# Patient Record
(demographics unavailable — no encounter records)

---

## 2024-10-08 NOTE — ASSESSMENT
[FreeTextEntry1] : Impression: This 30-year-old Stateless-speaking patient seen with the .  She has a history of dextrocardia and VSD presents with chronic headache consistent with migraine without aura.  The headaches have been occurring on essentially daily basis.  Most recently the headaches are right hemicranial and there is a cervicogenic component.  Recommendations: Discontinue Ubrelvy and the amitriptyline is being stopped as well.  Previously she received topiramate.  Headaches persist and spite of these medications.  Therefore as a preventative I would suggest Ubrelvy 60 mg to be taken once daily.  In addition I have suggested physical therapy to the cervical spine.  Office follow-up in 6 weeks.  The patient was given several Qulipta samples as we await the authorization for the medication.

## 2024-10-08 NOTE — PHYSICAL EXAM
[FreeTextEntry1] : Head:  Normocephalic Neck: Supple nontender.   Mental Status:  Alert Oriented X3 Speech normal and no aphasia or dysarthria.  Cranial Nerves:  PERRL, Visual Fields full  EOMI no diplopia no ptosis no nystagmus, V through XII intact.  Motor:  No drift, normal strength tone and coordination and no focal atrophy. No abnormal movements. No dysmetria.  Normal rapid alternating movements.   DTRs: Symmetric and 2+.  Plantars flexor.  No Clonus.  Sensory:  Normal testing.  Gait:  Normal.

## 2024-10-08 NOTE — HISTORY OF PRESENT ILLNESS
[FreeTextEntry1] : This patient is seen for an office visit.  She is Greenlandic-speaking and  is available ID number [id number]  This patient has ongoing headaches essentially on a daily basis and worse after a night of work occurring in the a.m. on awakening.  The headaches and are predominantly right hemicranial and they do occasionally seem to originate from the right side of the neck and there is underlying neck discomfort.  There is no improvement with Ubrelvy 100 mg as directed as needed and amitriptyline 10 mg which she has been taking at bedtime prescribed by her GI consultant.  MRI of the brain at 1/23/2024 did reveal a small number of white matter hyperintense foci nonspecific and cerebellar tonsils below the foramen magnum not enough to meet the criteria for Chiari malformation.  There was tortuosity of the left vertebral artery deforming the ventral medullary surface considered to be unremarkable there was sinus changes which were chronic.

## 2024-11-18 NOTE — PHYSICAL EXAM

## 2024-11-18 NOTE — PHYSICAL EXAM

## 2024-11-18 NOTE — DISCUSSION/SUMMARY
[FreeTextEntry1] : The patient is a 30-year-old Citizen of [place] speaking female dextrocardia, VSD with palpitations.  #1 CV- no change on exam, echo not able to visualize VSD again, exercise stress test negative #2 Palpitations- c/w diltiazem 30mg tid, event monitor ordered, #3 General- We discussed adherence to a Mediterranean diet, weight loss of the 20 pounds she gained and at least 30 minutes of daily exercise. STress of working nights contributing.

## 2024-11-18 NOTE — DISCUSSION/SUMMARY
[FreeTextEntry1] : The patient is a 30-year-old [place]raltarian speaking female dextrocardia, VSD with palpitations.  #1 CV- no change on exam, echo not able to visualize VSD again, exercise stress test negative #2 Palpitations- c/w diltiazem 30mg tid, event monitor ordered, #3 General- We discussed adherence to a Mediterranean diet, weight loss of the 20 pounds she gained and at least 30 minutes of daily exercise. STress of working nights contributing.

## 2024-11-18 NOTE — REVIEW OF SYSTEMS
[Weight Gain (___ Lbs)] : [unfilled] ~Ulb weight gain [SOB] : shortness of breath [Dyspnea on exertion] : not dyspnea during exertion [Chest Discomfort] : no chest discomfort [Lower Ext Edema] : no extremity edema [Palpitations] : palpitations [Negative] : Heme/Lymph

## 2024-11-19 NOTE — HISTORY OF PRESENT ILLNESS
[FreeTextEntry1] : This patient is seen for an office visit.  She is Sammarinese-speaking.   ID number [id number].  The patient has ongoing headaches which can be daily especially after a night of work.  She was given a note to bring to work to see if she can be switched to days but her work did not comply.  The headache is associated right-sided neck pain and can the either right or left-sided and can localize about the left eye.  There was no improvement with Ubrelvy amitriptyline or topiramate.  Qulipta 60 mg once daily was ordered but apparently the medication was not delivered to her home or to a local pharmacy and she has not been able to obtain the medication.  She has had physical therapy for neck pain which was of no benefit.  MRI of the brain on 1/23/2024 did reveal some minimal white matter hyperintense nonspecific foci the cerebellar tonsils were below the foramen magnum but not to meet criteria for Chiari malformation.  There was tortuosity of the left vertebral artery deforming the ventral medullary surface considered to be unremarkable.  There was some sinus changes which were chronic.

## 2024-11-19 NOTE — ASSESSMENT
[FreeTextEntry1] : Impression: This 30-year-old Yemeni-speaking patient is seen with the .  She has a history of dextrocardia and VSD and she presents with chronic headache disorder consistent with migraine without aura and there is probably a cervicogenic component.  The headaches have been occurring on a almost daily basis.  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  Recommendations: Her work did not comply with the note that I provided requesting a switch to the dayshift.  Physical therapy was of no benefit.  Medications including Ubrelvy amitriptyline and topiramate were of no benefit.  Suggest trial of Qulipta 60 mg once daily.  If possible the medication should be delivered to her home or local pharmacy.  Office follow-up.

## 2024-11-19 NOTE — PHYSICAL EXAM
[FreeTextEntry1] : Head:  Normocephalic Neck: Mild right paracervical tenderness mild restriction.  Mental Status:  Alert Oriented X3 Speech normal and no aphasia or dysarthria.  Cranial Nerves:  PERRL, Visual Fields full  EOMI no diplopia no ptosis no nystagmus, V through XII intact.  Motor:  No drift, normal strength tone and coordination and no focal atrophy. No abnormal movements. No dysmetria.  Normal rapid alternating movements.   DTRs: Symmetric and 2+.  Plantars flexor.  No Clonus.  Sensory: Normal testing with touch bilaterally.  Gait:  Normal.

## 2024-12-16 NOTE — HISTORY OF PRESENT ILLNESS
[FreeTextEntry1] : Follow up visit. She reports that the pain improved to some degree with amitriptyline. She stopped pantoprazole 3 days ago (prescription ran out).

## 2024-12-16 NOTE — REASON FOR VISIT
[Follow-up] : a follow-up of an existing diagnosis [[employer] ] : provided by [employer]   [Interpreters_IDNumber] : [id number] [Interpreters_FullName] : Jens

## 2024-12-16 NOTE — ASSESSMENT
[FreeTextEntry1] : Take pantoprazole. Increased amitriptyline to 20 mg before bed. She agrees, Can take FD Moses as needed. Follow up in 3 months or earlier if needed. She agrees.

## 2024-12-16 NOTE — PHYSICAL EXAM
[Alert] : alert [Normal Voice/Communication] : normal voice/communication [Healthy Appearing] : healthy appearing [No Acute Distress] : no acute distress [Sclera] : the sclera and conjunctiva were normal [Hearing Threshold Finger Rub Not [place]] : hearing was normal [Normal Lips/Gums] : the lips and gums were normal [Oropharynx] : the oropharynx was normal [Normal Appearance] : the appearance of the neck was normal [No Neck Mass] : no neck mass was observed [No Respiratory Distress] : no respiratory distress [No Acc Muscle Use] : no accessory muscle use [Respiration, Rhythm And Depth] : normal respiratory rhythm and effort [Auscultation Breath Sounds / Voice Sounds] : lungs were clear to auscultation bilaterally [Normal S1, S2] : normal S1 and S2 [Heart Rate And Rhythm] : heart rate was normal and rhythm regular [Murmurs] : no murmurs [Bowel Sounds] : normal bowel sounds [Abdomen Tenderness] : non-tender [No Masses] : no abdominal mass palpated [Abdomen Soft] : soft [] : no hepatosplenomegaly [No CVA Tenderness] : no CVA  tenderness [Involuntary Movements] : no involuntary movements were seen [Normal Color / Pigmentation] : normal skin color and pigmentation [No Focal Deficits] : no focal deficits [Oriented To Time, Place, And Person] : oriented to person, place, and time

## 2025-02-10 NOTE — PHYSICAL EXAM
[FreeTextEntry1] : Head:  Normocephalic no cranial tenderness.  Neck: Nontender no spasm.  Mental Status:  Alert Oriented X3 Speech normal and no aphasia or dysarthria.  Cranial Nerves:  PERRL, Visual Fields full  EOMI no diplopia no ptosis no nystagmus, V through XII intact.  Motor:  No drift, normal strength tone and coordination and no focal atrophy. No abnormal movements. No dysmetria.  Normal rapid alternating movements.   DTRs: Symmetric.  Sensory: Unremarkable.  Gait:  Normal.

## 2025-02-10 NOTE — HISTORY OF PRESENT ILLNESS
[FreeTextEntry1] : This patient is seen for an office visit accompanied by her son who interprets since she is Tajik-speaking.  She has ongoing headaches which occur especially in the morning after a night of work.  Previously I did provide a note requesting that she have her work switched to the dayshift that attempt to moderate the headache without success.  The headache can be associated with neck pain and can be frontal affecting both eyes more so on the right.    She has been given multiple medication trials without success including Ubrelvy amitriptyline topiramate and most recently Qulipta 60 mg once daily.  She has been taking the Qulipta with some improvement though she does have breakthrough headaches as described.  She denies side effects from the medication.  She has had physical therapy in the past without success.  MRI of the brain on 1/23/2024 revealed some minimal white matter hyperintense foci nonspecific in the cerebellar tonsils below the foramen magnum but did not meet criteria for Chiari.  There was tortuosity of the left vertebral deforming the ventral medullary surface considered to unremarkable.  There were no significant changes in the sinuses.

## 2025-02-10 NOTE — ASSESSMENT
[FreeTextEntry1] : Impression: This 31-year-old female patient has a history of dextrocardia and VSD and she has a chronic headache disorder most consistent with migraine without aura.  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  The headache will be most pronounced in the morning after a night of work.  Neurological exam is been unremarkable.  Brain MRI was unremarkable.  Recommendations: Multiple medications have been tried without success including amitriptyline topiramate Ubrelvy and most recently Qulipta has given her only partial relief but no side effects.  Physical therapy to the cervical spine was of no benefit.  I did suggest she see Dr. Mervin Phillip headache/pain management specialist for an opinion.  Maintain Qulipta 60 mg once daily for now.  Another letter was provided for work asking that she be switched from the night to the day shift.

## 2025-02-20 NOTE — ASSESSMENT
[FreeTextEntry1] : This is a case of a 31 yr right handed female with PMH of cervicogenic HA, migraine, presents today with headaches. Educated patient on headache triggers: try to adjust sleep sue for work if they are unable to accommodate sue Pt failed multiple meds: amitriptyline, topiramate, ubrelvy Trial botox consider d/c qulitpa after Botox consider emgality   Plan:     {      } Botox 1 month    {      } continue Qulipta for now    {      } F/u with Dr Chavez as [facility]   Headache education provided: [] Stay well hydrated [] Limit excessive caffeine and alcohol intake [] Maintain good sleep hygiene [] Try to avoid triggers [] Practice good eating habits [] Avoid excessive use of over the counter pain medications, as they can cause medication overuse headaches  [] Keep a headache diary [] Relaxation techniques, biofeedback, massage therapy, acupunctures, and heating pads may be effective  The above plan was discussed with Giselle Candelario in great detail. Giselle Libradogaetano verbalized understanding and agrees with plan as detailed above. Patient was provided education and counselling on current diagnosis/symptoms. She was advised to call our clinic at [phone] for any new or worsening symptoms, or with any questions or concerns. In case of acute onset of neurological symptoms or worsening presentation, patient was advised to present to nearest emergency room for further evaluation. Giselle Libradogaetano expressed understanding and all her questions/concerns were addressed.

## 2025-02-20 NOTE — HISTORY OF PRESENT ILLNESS
[FreeTextEntry1] : This is a case of a 31 yr right handed female with PMH of cervicogenic HA, migraine, presents today with headaches.  Pt has had a history of headaches since childhood.  Headaches worsened as she got older.  She is a patient of Dr Chavez since Jan 24 and is being treated for migraines.  She was sent here for a consult. Pt has been trialed on amitriptyline, topiramate which did not help with headaches.  She is now currently on Qulipta 60 mg with mild relief from headaches for only a short period.   Headaches are still daily.  Headaches last all day intermittently.  Located frontal, right orbital, and occipital.  Described as pressure.  Denies n/v.  Does have photophobia denies phonophobia.  Does have blurred vision.  Denies visual loss or doubled.  Denies extremity weakness or slurred speech.  Denies numbness or tingling.  She has tried ubrelvy for abortive which did not help it.     Past medication: amitriptyline, topiramate, , Ubrelvy Current medication: qulitpa 60 mg Occupation:  [employer]  Allergies: NKA

## 2025-02-20 NOTE — PHYSICAL EXAM
[General Appearance - Alert] : alert [General Appearance - Well Nourished] : well nourished [General Appearance - Well-Appearing] : healthy appearing [Oriented To Time, Place, And Person] : oriented to person, place, and time [Affect] : the affect was normal [Memory Recent] : recent memory was not impaired [Person] : oriented to person [Place] : oriented to place [Time] : oriented to time [Cranial Nerves Oculomotor (III)] : extraocular motion intact [Cranial Nerves Facial (VII)] : face symmetrical [Cranial Nerves Accessory (XI - Cranial And Spinal)] : head turning and shoulder shrug symmetric [Motor Tone] : muscle tone was normal in all four extremities [Motor Strength] : muscle strength was normal in all four extremities [Balance] : balance was intact [2+] : Patella left 2+ [Sclera] : the sclera and conjunctiva were normal [] : no respiratory distress [Abnormal Walk] : normal gait [Skin Color & Pigmentation] : normal skin color and pigmentation

## 2025-03-24 NOTE — HISTORY OF PRESENT ILLNESS
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 Was taking amitriptyline with some benefit. She reports some improvement with abdominal pain but not complete resolution when taking this medication.

## 2025-03-24 NOTE — PHYSICAL EXAM
[Alert] : alert [Normal Voice/Communication] : normal voice/communication [Healthy Appearing] : healthy appearing [No Acute Distress] : no acute distress [Sclera] : the sclera and conjunctiva were normal [Hearing Threshold Finger Rub Not [place]] : hearing was normal [Normal Lips/Gums] : the lips and gums were normal [Oropharynx] : the oropharynx was normal [Normal Appearance] : the appearance of the neck was normal [No Neck Mass] : no neck mass was observed [No Respiratory Distress] : no respiratory distress [No Acc Muscle Use] : no accessory muscle use [Respiration, Rhythm And Depth] : normal respiratory rhythm and effort [Auscultation Breath Sounds / Voice Sounds] : lungs were clear to auscultation bilaterally [Heart Rate And Rhythm] : heart rate was normal and rhythm regular [Normal S1, S2] : normal S1 and S2 [Murmurs] : no murmurs [Bowel Sounds] : normal bowel sounds [Abdomen Tenderness] : non-tender [No Masses] : no abdominal mass palpated [Abdomen Soft] : soft [] : no hepatosplenomegaly [No CVA Tenderness] : no CVA  tenderness [Involuntary Movements] : no involuntary movements were seen [Normal Color / Pigmentation] : normal skin color and pigmentation [No Focal Deficits] : no focal deficits [Oriented To Time, Place, And Person] : oriented to person, place, and time

## 2025-03-24 NOTE — REVIEW OF SYSTEMS
[As Noted in HPI] : as noted in HPI [Abdominal Pain] : abdominal pain [Negative] : Heme/Lymph [FreeTextEntry7] : improved with PPI

## 2025-03-24 NOTE — ASSESSMENT
[FreeTextEntry1] : This is a case of a 31 yr right handed female with PMH of cervicogenic HA, migraine, presents today for a follow up and botox.    Plan:  { \\} Botox 3 months  { \\}can stop qulitpa 60 mg  { \\} F/u with Dr Chavez as [facility]   Headache education provided: [] Stay well hydrated [] Limit excessive caffeine and alcohol intake [] Maintain good sleep hygiene [] Try to avoid triggers [] Practice good eating habits [] Avoid excessive use of over the counter pain medications, as they can cause medication overuse headaches [] Keep a headache diary [] Relaxation techniques, biofeedback, massage therapy, acupunctures, and heating pads may be effective  The above plan was discussed with Giselle Palomino in great detail. Giselle Candelario verbalized understanding and agrees with plan as detailed above. Patient was provided education and counselling on current diagnosis/symptoms. She was advised to call our clinic at [phone] for any new or worsening symptoms, or with any questions or concerns. In case of acute onset of neurological symptoms or worsening presentation, patient was advised to present to nearest emergency room for further evaluation. Giselle Candelario expressed understanding and all her questions/concerns were addressed.

## 2025-03-24 NOTE — REASON FOR VISIT
Arrived to the [facility]fusion [facility].  500mL blood removed via therapeutic phlebotomy. Patient tolerated well. Monitored post and given water.  BP wnl.    Any issues or concerns during appointment: no.  Next infusion appointment is 9/23/21 at 11am. Discharged ambulatory.    [Follow-up] : a follow-up of an existing diagnosis

## 2025-03-24 NOTE — HISTORY OF PRESENT ILLNESS
[FreeTextEntry1] : This is a case of a 31 yr right handed female with PMH of cervicogenic HA, migraine, presents today for a follow up and botox.    Pt returns today for Botox appointment. Patient denies any new migraine symptoms. Discussed Botox procedure.  All questions answered. Consent signed.  Symptoms: headache, chronic headache, photophobia, blurred, neck pain   Headache Days/Month: Prior to Botox tx: 30 Headache Hours/Day: Prior to Botox tx: 20-24   Past medication: amitriptyline, topiramate,  Ubrelvy Current medication: qulitpa 60 mg   Occupation: [employer] Allergies: NKA

## 2025-03-24 NOTE — ASSESSMENT
[FreeTextEntry1] : Continue amitriptyline (renewed) Follow up with neurology. Follow up with me in 3 months  Patient advised to sign record release as she is now going to Dr. Kirby as her PMD.

## 2025-06-23 NOTE — HISTORY OF PRESENT ILLNESS
[FreeTextEntry1] : Follow up visit for nonulcer dyspepsia. She reports occasional brakthrough indigestion when taking spicy foods or sodas but otherwise OK. She reports that she has been taking the amitriptyline. Denies n/v, dysphagia.

## 2025-06-23 NOTE — ASSESSMENT
[FreeTextEntry1] : Reassured patient. Advised her to continue amitriptyline for nonulcer dyspepsia. Can restart omeprazole 20 mg daily for possible minor GERD component to her symptoms. Scripts sent for both. Follow up in 6 months.

## 2025-06-23 NOTE — ASSESSMENT
[FreeTextEntry1] : This is a case of a 31 yr right handed female with PMH of cervicogenic HA, migraine, presents today for a follow up and botox.   Plan:  { \} Botox 3 month  { \} F/u with Dr Chavez as [facility]   Headache education provided: [] Stay well hydrated [] Limit excessive caffeine and alcohol intake [] Maintain good sleep hygiene [] Try to avoid triggers [] Practice good eating habits [] Avoid excessive use of over the counter pain medications, as they can cause medication overuse headaches [] Keep a headache diary [] Relaxation techniques, biofeedback, massage therapy, acupunctures, and heating pads may be effective  The above plan was discussed with Giselle Palomino in great detail. Giselle Candelario verbalized understanding and agrees with plan as detailed above. Patient was provided education and counselling on current diagnosis/symptoms. She was advised to call our clinic at [phone] for any new or worsening symptoms, or with any questions or concerns. In case of acute onset of neurological symptoms or worsening presentation, patient was advised to present to nearest emergency room for further evaluation. Giselle Palomino expressed understanding and all her questions/concerns were addressed.

## 2025-06-23 NOTE — PHYSICAL EXAM
[General Appearance - Alert] : alert [General Appearance - Well Nourished] : well nourished [Oriented To Time, Place, And Person] : oriented to person, place, and time [Affect] : the affect was normal [Person] : oriented to person [Place] : oriented to place [Cranial Nerves Optic (II)] : visual acuity intact bilaterally,  visual fields full to confrontation, pupils equal round and reactive to light [Cranial Nerves Accessory (XI - Cranial And Spinal)] : head turning and shoulder shrug symmetric [Motor Tone] : muscle tone was normal in all four extremities [Motor Strength] : muscle strength was normal in all four extremities [Balance] : balance was intact [Neck Appearance] : the appearance of the neck was normal [] : no respiratory distress [Abnormal Walk] : normal gait [Skin Color & Pigmentation] : normal skin color and pigmentation

## 2025-06-23 NOTE — HISTORY OF PRESENT ILLNESS
[FreeTextEntry1] : This is a case of a 31 yr right handed female with PMH of cervicogenic HA, migraine, presents today for a follow up and botox.  Pt is here today for follow up and botox appt.  Pt doing very well with botox.  She reports a recent increase as the botox wears off.  Denies any new or worsening symptoms.  Pt returns today for Botox appointment. Patient denies any new migraine symptoms. Discussed Botox procedure. All questions answered. Consent signed.  Symptoms: headache, chronic headache, photophobia, blurred, neck pain  Headache Days/Month: Prior to Botox tx: 30 current 3 Headache Hours/Day: Prior to Botox tx: 20-24   Past medication: amitriptyline, topiramate, Ubrelvy Current medication: qulitpa 60 mg   Occupation: [employer] Allergies: NKA

## 2025-06-23 NOTE — PHYSICAL EXAM
[Alert] : alert [Normal Voice/Communication] : normal voice/communication [Healthy Appearing] : healthy appearing [No Acute Distress] : no acute distress [Sclera] : the sclera and conjunctiva were normal [Hearing Threshold Finger Rub Not [place]] : hearing was normal [Normal Lips/Gums] : the lips and gums were normal [Oropharynx] : the oropharynx was normal [Normal Appearance] : the appearance of the neck was normal [No Neck Mass] : no neck mass was observed [No Respiratory Distress] : no respiratory distress [No Acc Muscle Use] : no accessory muscle use [Respiration, Rhythm And Depth] : normal respiratory rhythm and effort [Auscultation Breath Sounds / Voice Sounds] : lungs were clear to auscultation bilaterally [Heart Rate And Rhythm] : heart rate was normal and rhythm regular [Normal S1, S2] : normal S1 and S2 [Murmurs] : no murmurs [Bowel Sounds] : normal bowel sounds [Abdomen Tenderness] : non-tender [No Masses] : no abdominal mass palpated [Abdomen Soft] : soft [] : no hepatosplenomegaly [No CVA Tenderness] : no CVA  tenderness [Involuntary Movements] : no involuntary movements were seen [Normal Color / Pigmentation] : normal skin color and pigmentation [No Focal Deficits] : no focal deficits [Oriented To Time, Place, And Person] : oriented to person, place, and time

## 2025-07-08 NOTE — DISCUSSION/SUMMARY
[FreeTextEntry1] : The patient is a 31-year-old Italian speaking female dextrocardia, VSD with palpitations and atypical chest pain  #1 CV- no change on exam, echo not able to visualize VSD again, exercise stress test negative in past but repeat, consider refer to Dr. Yoder  #2 Palpitations- c/w diltiazem 30mg tid, event monitor low burden ectopy #3 General- We discussed adherence to a Mediterranean diet, weight loss of the 20 pounds she gained and at least 30 minutes of daily exercise. STress of working nights contributing.  [EKG obtained to assist in diagnosis and management of assessed problem(s)] : EKG obtained to assist in diagnosis and management of assessed problem(s)

## 2025-07-08 NOTE — DISCUSSION/SUMMARY
[FreeTextEntry1] : The patient is a 31-year-old [place] speaking female dextrocardia, VSD with palpitations and atypical chest pain  #1 CV- no change on exam, echo not able to visualize VSD again, exercise stress test negative in past but repeat, consider refer to Dr. Yoder  #2 Palpitations- c/w diltiazem 30mg tid, event monitor low burden ectopy #3 General- We discussed adherence to a Mediterranean diet, weight loss of the 20 pounds she gained and at least 30 minutes of daily exercise. STress of working nights contributing.  [EKG obtained to assist in diagnosis and management of assessed problem(s)] : EKG obtained to assist in diagnosis and management of assessed problem(s)

## 2025-07-08 NOTE — REVIEW OF SYSTEMS
[Weight Gain (___ Lbs)] : [unfilled] ~Ulb weight gain [SOB] : shortness of breath [Palpitations] : palpitations [Negative] : Heme/Lymph [Dyspnea on exertion] : not dyspnea during exertion [Chest Discomfort] : no chest discomfort [Lower Ext Edema] : no extremity edema

## 2025-07-08 NOTE — HISTORY OF PRESENT ILLNESS
[FreeTextEntry1] : 31-year-old [place] speaking female dextrocardia, VSD with palpitations and chest pain. WOrks at night. Symptoms have not changed but continue to bother her.

## 2025-07-08 NOTE — HISTORY OF PRESENT ILLNESS
[FreeTextEntry1] : 31-year-old Turk[place] speaking female dextrocardia, VSD with palpitations and chest pain. WOrks at night. Symptoms have not changed but continue to bother her.

## 2025-07-08 NOTE — PHYSICAL EXAM

## 2025-07-08 NOTE — PHYSICAL EXAM
